# Patient Record
Sex: MALE | URBAN - METROPOLITAN AREA
[De-identification: names, ages, dates, MRNs, and addresses within clinical notes are randomized per-mention and may not be internally consistent; named-entity substitution may affect disease eponyms.]

---

## 2023-09-29 ENCOUNTER — OFFICE VISIT (OUTPATIENT)
Dept: URBAN - METROPOLITAN AREA CLINIC 84 | Facility: CLINIC | Age: 43
End: 2023-09-29

## 2023-10-12 ENCOUNTER — OFFICE VISIT (OUTPATIENT)
Dept: URBAN - METROPOLITAN AREA CLINIC 27 | Facility: CLINIC | Age: 43
End: 2023-10-12

## 2023-10-17 ENCOUNTER — OFFICE VISIT (OUTPATIENT)
Dept: URBAN - METROPOLITAN AREA CLINIC 25 | Facility: CLINIC | Age: 43
End: 2023-10-17
Payer: COMMERCIAL

## 2023-10-17 ENCOUNTER — DASHBOARD ENCOUNTERS (OUTPATIENT)
Age: 43
End: 2023-10-17

## 2023-10-17 VITALS
HEART RATE: 69 BPM | WEIGHT: 139.4 LBS | SYSTOLIC BLOOD PRESSURE: 118 MMHG | HEIGHT: 73 IN | BODY MASS INDEX: 18.47 KG/M2 | TEMPERATURE: 98.8 F | DIASTOLIC BLOOD PRESSURE: 81 MMHG

## 2023-10-17 DIAGNOSIS — D61.818 PANCYTOPENIA: ICD-10-CM

## 2023-10-17 DIAGNOSIS — Z12.11 SCREENING COLONOSCOPY: ICD-10-CM

## 2023-10-17 DIAGNOSIS — K59.01 CONSTIPATION: ICD-10-CM

## 2023-10-17 DIAGNOSIS — K60.0 ACUTE ANAL FISSURE: ICD-10-CM

## 2023-10-17 PROBLEM — 127034005: Status: ACTIVE | Noted: 2023-10-17

## 2023-10-17 PROCEDURE — 99204 OFFICE O/P NEW MOD 45 MIN: CPT | Performed by: INTERNAL MEDICINE

## 2023-10-17 RX ORDER — POLYETHYLENE GLYCOL 3350, SODIUM SULFATE, SODIUM CHLORIDE, POTASSIUM CHLORIDE, ASCORBIC ACID, SODIUM ASCORBATE 140-9-5.2G
AS DIRECTED KIT ORAL AS DIRECTED
Qty: 1 BOX | Refills: 0 | OUTPATIENT
Start: 2023-10-17 | End: 2023-10-18

## 2023-10-17 NOTE — HPI-TODAY'S VISIT:
This patient was referred by Dr. Michelle Graham for an evaluation of rectal bleed.  A copy of this will be sent to the referring provider.  He was diagnosed with testicular cancer in 2021.  He had surgery.  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  He has not gotten any XRT.  On 9/20 he developed constipation which led to rectal pain and bleeding.  He went to the [facility] ED on 9/2 and diagnoed with an anal fissure.  ED records were reviewed in clinic today including attending notes, imaging, BMP/CBC/LFT's. He started some laxatives and since then things have improve slightly.  He was on Abx and he developed some Abx.  He now has a daily formed BM.  He no longer has the rectal pain.  He has occasional blood on the tissu.  He denies abdominal pain.  His appetite has improved since he finished Chemotx.  He denies UGI symptoms.  He denies dysphagia.  There is no Fhx of colon cancer.  He has not had prior colonoscopy.  He have pancytopenia when he was in the ED.  He had labs today but does not know the results

## 2023-11-08 ENCOUNTER — OUT OF OFFICE VISIT (OUTPATIENT)
Dept: URBAN - METROPOLITAN AREA SURGERY CENTER 20 | Facility: SURGERY CENTER | Age: 43
End: 2023-11-08
Payer: COMMERCIAL

## 2023-11-08 DIAGNOSIS — Z12.11 COLON CANCER SCREENING: ICD-10-CM

## 2023-11-08 DIAGNOSIS — K64.8 OTHER HEMORRHOIDS: ICD-10-CM

## 2023-11-08 PROCEDURE — G8907 PT DOC NO EVENTS ON DISCHARG: HCPCS | Performed by: INTERNAL MEDICINE

## 2023-11-08 PROCEDURE — 00811 ANES LWR INTST NDSC NOS: CPT | Performed by: ANESTHESIOLOGY

## 2023-11-08 PROCEDURE — 45378 DIAGNOSTIC COLONOSCOPY: CPT | Performed by: INTERNAL MEDICINE
